# Patient Record
Sex: FEMALE | Race: BLACK OR AFRICAN AMERICAN | NOT HISPANIC OR LATINO | Employment: FULL TIME | URBAN - METROPOLITAN AREA
[De-identification: names, ages, dates, MRNs, and addresses within clinical notes are randomized per-mention and may not be internally consistent; named-entity substitution may affect disease eponyms.]

---

## 2020-11-11 ENCOUNTER — TELEPHONE (OUTPATIENT)
Dept: ORTHOPEDICS | Facility: CLINIC | Age: 55
End: 2020-11-11

## 2020-11-11 DIAGNOSIS — R52 PAIN: Primary | ICD-10-CM

## 2020-11-12 ENCOUNTER — HOSPITAL ENCOUNTER (OUTPATIENT)
Dept: RADIOLOGY | Facility: OTHER | Age: 55
Discharge: HOME | End: 2020-11-12
Attending: PHYSICIAN ASSISTANT
Payer: COMMERCIAL

## 2020-11-12 ENCOUNTER — OFFICE VISIT (OUTPATIENT)
Dept: ORTHOPEDICS | Facility: CLINIC | Age: 55
End: 2020-11-12
Payer: COMMERCIAL

## 2020-11-12 VITALS
SYSTOLIC BLOOD PRESSURE: 108 MMHG | HEART RATE: 101 BPM | BODY MASS INDEX: 30.12 KG/M2 | WEIGHT: 170 LBS | HEIGHT: 63 IN | DIASTOLIC BLOOD PRESSURE: 80 MMHG

## 2020-11-12 DIAGNOSIS — M77.12 LATERAL EPICONDYLITIS OF LEFT ELBOW: ICD-10-CM

## 2020-11-12 DIAGNOSIS — R52 PAIN: ICD-10-CM

## 2020-11-12 DIAGNOSIS — M65.4 RADIAL STYLOID TENOSYNOVITIS (DE QUERVAIN): Primary | ICD-10-CM

## 2020-11-12 DIAGNOSIS — R20.0 FINGER NUMBNESS: ICD-10-CM

## 2020-11-12 PROCEDURE — 99999 PR PBB SHADOW E&M-EST. PATIENT-LVL III: ICD-10-PCS | Mod: PBBFAC,,, | Performed by: PHYSICIAN ASSISTANT

## 2020-11-12 PROCEDURE — 97760 ORTHOTIC MGMT&TRAING 1ST ENC: CPT | Mod: GP,S$GLB,, | Performed by: PHYSICIAN ASSISTANT

## 2020-11-12 PROCEDURE — 73110 X-RAY EXAM OF WRIST: CPT | Mod: 26,,, | Performed by: RADIOLOGY

## 2020-11-12 PROCEDURE — 1125F PR PAIN SEVERITY QUANTIFIED, PAIN PRESENT: ICD-10-PCS | Mod: S$GLB,,, | Performed by: PHYSICIAN ASSISTANT

## 2020-11-12 PROCEDURE — 99999 PR PBB SHADOW E&M-EST. PATIENT-LVL III: CPT | Mod: PBBFAC,,, | Performed by: PHYSICIAN ASSISTANT

## 2020-11-12 PROCEDURE — 3008F BODY MASS INDEX DOCD: CPT | Mod: CPTII,S$GLB,, | Performed by: PHYSICIAN ASSISTANT

## 2020-11-12 PROCEDURE — 99204 OFFICE O/P NEW MOD 45 MIN: CPT | Mod: S$GLB,,, | Performed by: PHYSICIAN ASSISTANT

## 2020-11-12 PROCEDURE — 97760 PR ORTHOTIC MGMT&TRAINJ INITIAL ENC EA 15 MINS: ICD-10-PCS | Mod: GP,S$GLB,, | Performed by: PHYSICIAN ASSISTANT

## 2020-11-12 PROCEDURE — 73110 X-RAY EXAM OF WRIST: CPT | Mod: TC,50,FY

## 2020-11-12 PROCEDURE — 73110 XR WRIST COMPLETE 3 VIEWS BILATERAL: ICD-10-PCS | Mod: 26,,, | Performed by: RADIOLOGY

## 2020-11-12 PROCEDURE — 1125F AMNT PAIN NOTED PAIN PRSNT: CPT | Mod: S$GLB,,, | Performed by: PHYSICIAN ASSISTANT

## 2020-11-12 PROCEDURE — 99204 PR OFFICE/OUTPT VISIT, NEW, LEVL IV, 45-59 MIN: ICD-10-PCS | Mod: S$GLB,,, | Performed by: PHYSICIAN ASSISTANT

## 2020-11-12 PROCEDURE — 3008F PR BODY MASS INDEX (BMI) DOCUMENTED: ICD-10-PCS | Mod: CPTII,S$GLB,, | Performed by: PHYSICIAN ASSISTANT

## 2020-11-12 NOTE — PATIENT INSTRUCTIONS
Understanding De Quervain Tenosynovitis    De Quervain tenosynovitis is a condition that can cause wrist and thumb pain. Tendons connect muscles in your wrist and forearm to the bones in your thumb. The tendons have a protective cover (sheath). The sheaths lining makes a fluid that lets the tendons slide easily when you straighten your wrist and thumb. If any of these tendons are irritated or injured, they can become swollen and inflamed. This is called de Quervain tenosynovitis.  How to say it  Rebekah ten-oh-sin-oh-VY-tis   What causes de Quervain tenosynovitis?  This condition is most often caused from overuse. For example, making the same wrist motions over and over can irritate the tendons. This includes doing things like unscrewing jar lids or grasping a tool. Activities such as typing, playing racquet sports, knitting, and texting can also lead to the condition.  Symptoms of de Quervain tenosynovitis  You may have pain, soreness, redness, and swelling along the side of your wrist and the base of your thumb. You may feel pain when you pinch or grasp things, turn or touch your wrist, or make a fist. Your thumb may catch or make a crackling sound when you move it.  Treatment for de Quervain tenosynovitis  Treatments may include:  · Resting the wrist and thumb. This involves limiting movements that make your symptoms worse. You also may need to avoid certain hobbies, sports, and types of work for a time.  · Cold packs. These help reduce pain and swelling.  · Prescription or over-the-counter pain medicines. These help relieve pain and swelling.  · Splint or brace. This helps keep the thumb and wrist from moving and gives time for your tendons to heal.  · Exercises or physical therapy. These help stretch, strengthen, and improve the range of motion in your wrist and thumb.  · Shots of medicine into the area around the tendon. These may help relieve symptoms for a time.  · Surgery. You may need surgery if  other treatments dont relieve symptoms. During surgery, the surgeon releases the sheath that surrounds the tendons so the tendons can move more easily.  Possible complications of de Quervain tenosynovitis  Without proper care and treatment, healing may take longer than normal. Also, symptoms may continue or get worse. Over time, the problem may become long-term (chronic). This can make it hard to use your wrist and thumb for normal activities.  When to call your healthcare provider  Call your healthcare provider right away if you have any of these:  · Fever of 100.4°F (38°C) or higher, or as directed  · Symptoms that dont get better with treatment, or get worse  · Pain, numbness, or coldness in the hand  · New symptoms   Date Last Reviewed: 3/10/2016  © 5231-9820 [employer]. [address] [place] [address]. All rights reserved. This information is not intended as a substitute for professional medical care. Always follow your healthcare professional's instructions.        Understanding Lateral Epicondylitis    Tendons are strong bands of tissue that connect muscles to bones. Lateral epicondylitis affects the tendons that connect muscles in the forearm to the lateral epicondyle. This is the bony knob on the outer side of the elbow. The condition occurs if the extensor tendons of the wrist become red and swollen (irritated). This can cause pain in the elbow, forearm, and wrist. Because the condition is sometimes caused by playing tennis, it is also known as tennis elbow.  How to say it  LA-tuhr-remi ad-ys-ZJI-duh-LY-tis   What causes lateral epicondylitis?  The condition most often occurs because of overuse. This can be from any activity that repeatedly puts stress on the forearm extensor muscles or tendons and wrist. For instance, playing tennis, lifting weights, cutting meat, painting, and typing can all cause the condition. Wear and tear of the tendons from aging or an injury to the  tendons can also cause the condition.  Symptoms of lateral epicondylitis  The most common symptom is pain. You may feel it on the outer side of the elbow and down the back of the forearm. It may be worse when moving or using the elbow, forearm, or wrist. You may also feel pain when gripping or lifting things.  Treatment for lateral epicondylitis  Treatments may include:  · Resting the elbow, forearm, and wrist. Youll need to avoid movements that can make your symptoms worse. You also may need to avoid certain sports and types of work for a time. This helps relieve symptoms and prevent further damage to the tendons.  · Changing the action that caused the problem. For instance, if the tendons were damaged from playing tennis, it may help to change your playing technique or use different equipment. This helps prevent further damage to the tendons.  · Using cold packs. Putting an ice pack on the injured area can help reduce pain and swelling.  · Taking pain medicines. Taking prescription or over-the-counter pain medicines may help reduce pain and swelling.    · Wearing a brace. This helps reduce strain on the muscles and tendons in the forearm, which may relieve symptoms. It is very important to wear the brace properly.  · Doing exercises and physical therapy. These help improve strength and range of motion in the elbow, forearm, and wrist.  · Getting shots of medicine into the injured area. These may help relieve symptoms for a time.  · Having surgery. This may be an option if other treatments fail to relieve symptoms. In many cases, the surgeon removes the damaged tissue.  Possible complications of lateral epicondylitis  If the tendons involved dont heal properly, symptoms may return or get worse. To help prevent this, follow your treatment plan as directed.  When to call your healthcare provider  Call your healthcare provider right away if you have any of these:  · Fever of 100.4°F (38°C) or higher, or as  directed  · Redness, swelling, or warmth in the elbow or forearm that gets worse  · Symptoms that dont get better with treatment, or get worse  · New symptoms   Date Last Reviewed: 3/10/2016  © 1297-7413 [employer]. [address] [place] [address]. All rights reserved. This information is not intended as a substitute for professional medical care. Always follow your healthcare professional's instructions.        Understanding Carpal Tunnel Syndrome    The carpal tunnel is a narrow space inside the wrist. It is ringed by bone and a band of tough tissue called the transverse carpal ligament. A major nerve called the median nerve runs from the forearm into the hand through the carpal tunnel. Tendons also run through the carpal tunnel.  With carpal tunnel syndrome, the tendons or nearby tissues within the carpal tunnel may swell or thicken. Or the transverse carpal ligament may harden and shorten. This narrows the space in the carpal tunnel and puts pressure on the median nerve. This pressure leads to tingling and numbness of the hand and wrist. In time, the condition can make even simple tasks hard to do.  What causes carpal tunnel syndrome?  Doctors arent entirely clear why the condition occurs. Certain things may make a person more likely to have it. These include:  · Being female  · Being pregnant  · Being overweight  · Having diabetes or rheumatoid arthritis  Symptoms of carpal tunnel syndrome  Symptoms often come and go. At first, symptoms may occur mainly at night. Later, they may be noticed during the day as well. They may get worse with activities such as driving, reading, typing, or holding a phone. Symptoms can include:  · Tingling and numbness in the hand or wrist  · Sharp pain that shoots up the arm or down to the fingers  · Hand stiffness or cramping, especially in the morning  · Trouble making a fist  · Hand weakness and clumsiness  Treatment for carpal tunnel syndrome  Certain  treatments help reduce the pressure on the median nerve and relieve symptoms. Choices for treatment may include one or more of the following:  · Wrist splint. This involves wearing a special brace on the wrist and hand. The splint holds the wrist straight, in a neutral position. This helps keep the carpal tunnel as open as possible.  · Cortisone shots. Cortisone is a medicine that helps reduce swelling. It is injected directly into the wrist. It helps shrink tissues inside the carpal tunnel. This relieves symptoms for a time.  · Pain medicines. You may take over-the-counter or prescription medicines to help reduce swelling and relieve symptoms.  · Surgery. If the condition doesnt respond to other treatments and doesnt go away on its own, you may need surgery. During surgery, the surgeon cuts the transverse carpal ligament to relieve pressure on the median nerve.     When to call your healthcare provider  Call your healthcare provider right away if you have any of these:  · Fever of 100.4°F (38°C) or higher, or as directed  · Symptoms that dont get better, or get worse  · New symptoms   Date Last Reviewed: 3/10/2016  © 7001-0567 [employer]. [address] [place] [address], [place] [address]. All rights reserved. This information is not intended as a substitute for professional medical care. Always follow your healthcare professional's instructions.

## 2020-11-12 NOTE — PROGRESS NOTES
"Subjective:      Patient ID: Raman Goldstein is a 55 y.o. female.    Chief Complaint: Pain of the Left Wrist and Pain of the Right Wrist      HPI  Raman Goldstein is a right hand dominant 55 y.o. female presenting today for bilateral wrist pains.  There was not a history of trauma or known repetitive activity.  Onset of symptoms began 1 month ago.  She reports that she initially had pain in the lateral left elbow 2 months ago, then began to have bilateral radial wrist pain.  She reports that the wrist pain is most bothersome, gets intermittent left elbow pains as well.  She has occasional night time numbness and tingling in the left hand.   She reports taking naproxen yesterday and feeling good relief.      Review of patient's allergies indicates:   Allergen Reactions    Grass pollen-june grass standard Hives    Shellfish containing products          Current Outpatient Medications   Medication Sig Dispense Refill    hydrOXYzine (ATARAX) 25 MG tablet Take 25 mg by mouth every 4 (four) hours as needed for Itching.      ibuprofen (ADVIL,MOTRIN) 800 MG tablet Take 1 tablet (800 mg total) by mouth every 6 (six) hours as needed for Pain. 30 tablet 0     No current facility-administered medications for this visit.        Past Medical History:   Diagnosis Date    Allergy        Past Surgical History:   Procedure Laterality Date    HYSTERECTOMY           Review of Systems:  Review of Systems   Constitution: Negative for chills and fever.   Skin: Negative for rash and suspicious lesions.   Musculoskeletal:        See HPI   Neurological: Negative for dizziness, headaches and light-headedness.   Psychiatric/Behavioral: Negative for depression. The patient is not nervous/anxious.          OBJECTIVE:     PHYSICAL EXAM:  Height: 5' 3" (160 cm) Weight: 77.1 kg (170 lb)  Vitals:    11/12/20 1546   BP: 108/80   Pulse: 101   Weight: 77.1 kg (170 lb)   Height: 5' 3" (1.6 m)   PainSc:   9     General    Vitals " reviewed.  Constitutional: She is oriented to person, place, and time. She appears well-developed and well-nourished.   HENT:   Head: Normocephalic and atraumatic.   Neck: Normal range of motion.   Cardiovascular: Normal rate.    Pulmonary/Chest: Effort normal. No respiratory distress.   Neurological: She is alert and oriented to person, place, and time.   Psychiatric: She has a normal mood and affect. Her behavior is normal. Judgment and thought content normal.             Musculoskeletal:  No scars noted.  No edema.  She is tender to palpation bilaterally at the radial styloid, mildly tender at the CMC joint bilaterally and at the left lateral epicondyle.  Good finger and wrist range of motion bilaterally, pain with bilateral wrist hyperextension, hyperflexion, radial and ulnar deviation.  Positive Finkelstein's bilaterally.  Good elbow range of motion.  Neurovascularly intact-good sensation and motor function, good capillary refill, 2+ radial pulses.  Negative Tinel's and negative Durkan's bilaterally.    RADIOGRAPHS:  Bilateral Wrist XRay, 11/12/2020  FINDINGS:  No acute fracture or dislocation seen.  Mild joint space narrowing 1st CMC joint on the left.  No suspicious osseous erosions.  Soft tissues are unremarkable with no radiopaque retained foreign body.     Impression:   No acute osseous abnormality seen.    Comments: I have personally reviewed the imaging and I agree with the above radiologist's report.    ASSESSMENT/PLAN:   Raman Larose was seen today for pain and pain.    Diagnoses and all orders for this visit:    Radial styloid tenosynovitis (de quervain)  -     Ambulatory referral/consult to Physical/Occupational Therapy    Lateral epicondylitis of left elbow  -     Ambulatory referral/consult to Physical/Occupational Therapy    Finger numbness  -     Ambulatory referral/consult to Physical/Occupational Therapy           - We talked at length about the anatomy and pathophysiology of   Encounter  Diagnoses   Name Primary?    Radial styloid tenosynovitis (de quervain) Yes    Lateral epicondylitis of left elbow     Finger numbness        - discussed patient's symptoms and physical exam findings, discussed conservative and surgical treatment options for de Quervain tenosynovitis, lateral epicondylitis, and nerve compression/carpal tunnel.  - orders for OT  - NSAIDs as needed  - discussed use of braces, thumb spica brace is recommended and provided (15 minutes spent preparing, fitting, and educating on brace).  - follow-up in 6-8 weeks, sooner if needed  - call with questions or concerns    Disclaimer: This note has been generated using voice-recognition software. There may be typographical errors that have been missed during proof-reading.

## 2020-11-27 ENCOUNTER — CLINICAL SUPPORT (OUTPATIENT)
Dept: REHABILITATION | Facility: HOSPITAL | Age: 55
End: 2020-11-27
Attending: PHYSICIAN ASSISTANT
Payer: COMMERCIAL

## 2020-11-27 DIAGNOSIS — R29.898 DECREASED PINCH STRENGTH: ICD-10-CM

## 2020-11-27 DIAGNOSIS — Z74.1 SELF-CARE DEFICIT IN DRESSING: ICD-10-CM

## 2020-11-27 DIAGNOSIS — M25.60 RANGE OF MOTION DEFICIT: ICD-10-CM

## 2020-11-27 DIAGNOSIS — Z74.1 SELF-CARE DEFICIT FOR FEEDING, BATHING, AND TOILETING: ICD-10-CM

## 2020-11-27 DIAGNOSIS — R29.898 DECREASED GRIP STRENGTH: ICD-10-CM

## 2020-11-27 PROCEDURE — 97165 OT EVAL LOW COMPLEX 30 MIN: CPT | Mod: PN

## 2020-11-27 PROCEDURE — 97035 APP MDLTY 1+ULTRASOUND EA 15: CPT | Mod: PN

## 2020-11-27 NOTE — PLAN OF CARE
[facility] Occupational Therapy  Initial Evaluation     Date:  11/27/2020    Name: Raman Goldstein  Clinic Number: [id number]    Therapy Diagnosis:   1. Range of motion deficit     2. Decreased  strength     3. Decreased pinch strength     4. Self-care deficit for feeding, bathing, and toileting     5. Self-care deficit in dressing        Physician: Stacy Frank PA    Physician Orders: Eval and treat   Medical Diagnosis:Radial styloid tenosynovitis (de quervain), Lateral epicondylitis of left elbow  Surgical Procedure and Date: n/a  Evaluation Date: 11/27/2020  Insurance Authorization Period Expiration: 12/31/20  Plan of Care Certification Period: 11/26/20 -1/22/21  Date of Return to MD: 12/28/20    Visit # / Visits authorized: 1 / 20    Time In: 8:49 am  Time Out: 9:45 am  Total Billable Time: 56 minutes    Precautions:  Standard    Subjective     Involved Side: Bilateral   Dominant Side: Right  Date of Onset: Late September  Mechanism of Injury: Insidious onset - may have been waxing her car.  History of Current Condition: increased pain in bilateral wrist, attempted sporadic aleve  Surgical Procedure: n/a  Imaging: xray FINDINGS:  No acute fracture or dislocation seen.  Mild joint space narrowing 1st CMC joint on the left.  No suspicious osseous erosions.  Soft tissues are unremarkable with no radiopaque retained foreign body.  Impression:  No acute osseous abnormality seen.  Electronically signed by: Lacie Vincent  Date:                                            11/12/2020    Previous Therapy: none    Past Medical History/Physical Systems Review:   Raman Goldstein  has a past medical history of Allergy.    Raman Goldstein  has a past surgical history that includes Hysterectomy.    Raman Larose has a current medication list which includes the following prescription(s): hydroxyzine hcl and ibuprofen.    Review of patient's allergies indicates:   Allergen Reactions     Grass pollen-june grass standard Hives    Shellfish containing products         Patient's Goals for Therapy: Full use of my hands, without pain    Pain:  Functional Pain Scale Rating 0-10:   5-6/10 at best  7-8/10 at worst  Location: distal radial forearm/wrist  Description: Sharp  Aggravating Factors: pushing up on hands, pain with space bar right   Easing Factors: hot water    Occupation:   in [place]  Working presently: employed  Duties: computer and conversation    Functional Limitations/Social History:    Previous functional status includes: Independent with all ADLs.     Current FunctionalStatus   Home/Living environment : lives alone      Limitation of Functional Status as follows:   ADLs/IADLs:     - Feeding: modified task    - Bathing: pain with pushing up from tub    - Dressing/Grooming: challenging donning bra, pulling on pants and socks    - Driving: modify      Leisure: gardening     Objective    Observation: Skin intact, no redness or swelling note in wrists    Sensation: Ulnar and Median Nerve  Intact  Ingalls Sharron Monofilament Test: No  Stereognosis: Intact    Special Tests:   Finkelstein's Test  Positive bilaterally    Edema: Circumferential measurements: as follows:    Thumb  left Thumb right       Proximal Plalnx 5.9 cm 6.4 cm      DIP Joint 5.8 cm 6.1 cm      Distal Phalnx 5.1 cm 5.5 cm          Range of Motion: right and left Active  (Ext/Flex) Thumb Thumb right      MP 0/57 0/49      DIP 0/48 0/37        Thumb Opposition: to all tips and 5th MCP head bilaterally  Palmar Abduction: L 35  R  40  Radial Abduction: 45 bilaterally    Wrist Ext/Flex: 60/50    55/55  Wrist RD/UD: 5/35   5/25  Supination/Pronation: WFL bilaterally    Manual Muscle Test: deferred secondary to pain     Strength: (SHAYY Dynamometer in lbs.)  1 trial, Position II  Right: 40.1#  Left: 44.5#    Pinch Strength: (Pinch Gauge in psi's), 1  trial  Adams Pinch R) 13psi's   L) 15psi's  3pt Pinch   R) 10psi's   L) 9psi's      Fine Blair Coordination Tests:   9 hole Peg Test R) 18 seconds no drops   L) 26 seconds dropped 1       CMS Impairment/Limitation/Restriction for FOTO wrist  Survey    Therapist reviewed FOTO scores for Raman Goldstein on 11/27/2020.   FOTO documents entered into [facility] - see Media section.    Limitation Score: 52%         Treatment     Treatment Time In: 9:30 am  Treatment Time Out: 9:45 am  Total Treatment time separate from Evaluation time:15    Raman received the following direct contact modalities after being cleared for contraindications for 12 minutes:  -Patient receives ultrasound  for pain control and decreased inflammation @ continuous duty cycle, 3.3 Mhz, applied to bilateral distal radial wrist, intensity = .5 w/cm2 for 12 minutes.    Home Exercise Program/Education:  Issued HEP (see patient instructions in EMR) and educated on modality use for pain management . Exercises were reviewed and Raman was able to demonstrate them prior to the end of the session.   Pt received a written copy of exercises to perform at home. Raman demonstrated good  understanding of the education provided.  Pt was advised to perform these exercises free of pain, and to stop performing them if pain occurs.    Patient/Family Education: role of OT, goals for OT, scheduling/cancellations - pt verbalized understanding. Discussed insurance limitations with patient.    Additional Education provided: pathology of tenosynovitis, importance of stretching and icing.     Assessment     Raman Goldstein is a 55 y.o. female referred to outpatient occupational therapy and presents with a medical diagnosis of Radial styloid tenosynovitis (de quervain), Lateral epicondylitis of left elbow, resulting in Decreased ROM, Decreased  strength, Decreased pinch strength, Decreased functional hand use and Increased pain and demonstrates limitations as described in the chart below. Following medical record review it  is determined that pt will benefit from occupational therapy services in order to maximize pain free and/or functional use of bilateral hands. The following goals were discussed with the patient and patient is in agreement with them as to be addressed in the treatment plan. The patient's rehab potential is Good.     Anticipated barriers to occupational therapy: none noted  Pt has no cultural, educational or language barriers to learning provided.    Profile and History Assessment of Occupational Performance Level of Clinical Decision Making Complexity Score   Occupational Profile:   Raman Goldstein is a 55 y.o. 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 Raman Goldstein has difficulty with  feeding, bathing, grooming and dressing  affecting his/her daily functional abilities. His/her main goal for therapy is Full use of my hands, without pain.     Comorbidities:   none noted    Medical and Therapy History Review:   Brief               Performance Deficits    Physical:  Joint Mobility   Strength  Pinch Strength  Pain    Cognitive:  No Deficits    Psychosocial:    No Deficits     Clinical Decision Making:  low    Assessment Process:  Problem-Focused Assessments    Modification/Need for Assistance:  Minimal-Moderate Modifications/Assistance    Intervention Selection:  Several Treatment Options       low  Based on PMHX, co morbidities , data from assessments and functional level of assistance required with task and clinical presentation directly impacting function.       The following goals were discussed with the patient and patient is in agreement with them as to be addressed in the treatment plan.     Goals:   Short term goals met in 4 weeks(12/28/20   Patient to be IND with HEP and modalities for pain/edema managment.   Increase right wrist and thumb AROM 10 degrees to increase functional hand use for ADLs/work/leisure activities.   Increase  strength 5 lbs. Each  hand to improve functional grasp for ADLs/work/leisure activities.   Decrease complaints of pain to  5 out of 10 to increase functional hand use for ADL/work/leisure activities.    Patient to be IND and consistent with Orthotic use    Long term goals to me met by d/c  Pt will demonstrate symmetric AROM of wrists and thumbs for improved functional use dressing and bathing  Pt will increase pinch strength to 14# for completion of bottle top opening   Pt will increase  strength to 50# right hand for leisure tasks  Pt will report MD of 2 out of 10 with active use of hands  Pt will engage in joint protection with her gardening and outdoor tasks    Plan   Certification Period/Plan of care expiration: 11/27/2020 to 1/22/21.    Outpatient Occupational Therapy 2 times weekly for 8 weeks to include the following interventions: Paraffin, Fluidotherapy, Modalities for pain management, US 3 mhz, Therapeutic exercises/activities. and Strengthening.      SUSANNAH Maharaj    I certify the need for these services furnished under this plan of treatment and while under my care    ____________________________________  Physician/Referring Practitioner    _______________  Date of Signature

## 2020-12-02 NOTE — PROGRESS NOTES
Occupational Therapy Treatment Note     Date: 12/3/2020  Name: Raman Goldstein  Clinic Number: [id number]    Therapy Diagnosis:   Encounter Diagnoses   Name Primary?    Range of motion deficit Yes    Decreased  strength     Decreased pinch strength     Self-care deficit for feeding, bathing, and toileting     Self-care deficit in dressing      Physician: Stacy Frank PA    Physician Orders: Eval and treat   Medical Diagnosis:Radial styloid tenosynovitis (de quervain), Lateral epicondylitis of left elbow  Surgical Procedure and Date: n/a  Evaluation Date: 11/27/2020  Insurance Authorization Period Expiration: 12/31/20  Plan of Care Certification Period: 11/26/20 -1/22/21  Date of Return to MD: 12/28/20     Visit # / Visits authorized: 2 / 20     Time In: 5:00 pm  Time Out: 5:48 pm  Total Billable Time: 48  minutes     Precautions:  Standard      Subjective     Pt reports: My left hand is doing much better.   she was not compliant with home exercise program given 11/27/20 as she did not ice after stretching   Response to previous treatment: no adverse affects  Functional change: none     Pain: 5-6/10  Right ,   2-3/10 left at rest  Location: bilateral wrists      Objective     Raman received the following direct contact modalities after being cleared for contraindications for 10 minutes:  -Patient receives ultrasound  for pain control and decreased inflammation @ continuous duty cycle, 3.3 Mhz, applied to radial wrist and thumb bilaterally , intensity = .5 w/cm2 for 5 minutes each hand.    Raman received the following manual therapy techniques for 38 minutes:   -IASTM of thumb extensor/abductor muscles of each hand with momma bear-sweeping and stroking  -manual massage of muscle bellies  -friction massage of radial wrists until decrease in pain  -finklestein stretch x 10 reps holding stretch 5 seconds  -ice massage until numb      Home Exercises and Education Provided     Education provided:    - re instruction in stretch and the importance of icing afterward  - Progress towards goals     Written Home Exercises Provided: Patient instructed to cont prior HEP.  Exercises were reviewed and Raman was able to demonstrate them prior to the end of the session.  Raman demonstrated good  understanding of the HEP provided.   .   See EMR under Patient Instructions for exercises provided 11/27/20.        Assessment     Pt would continue to benefit from skilled OT. Raman reported improvement in hand symptoms post therapy session.  Better understanding of the stretch and ice HEP.      Raman is progressing well towards her goals and there are no updates to goals at this time. Pt prognosis is Good.     Pt will continue to benefit from skilled outpatient occupational therapy to address the deficits listed in the problem list on initial evaluation provide pt/family education and to maximize pt's level of independence in the home and community environment.     Anticipated barriers to occupational therapy: none noted    Pt's spiritual, cultural and educational needs considered and pt agreeable to plan of care and goals.    Goals:  Short term goals met in 4 weeks(12/28/20)   Patient to be IND with HEP and modalities for pain/edema managment.   Increase right wrist and thumb AROM 10 degrees to increase functional hand use for ADLs/work/leisure activities.   Increase  strength 5 lbs. Each hand to improve functional grasp for ADLs/work/leisure activities.   Decrease complaints of pain to  5 out of 10 to increase functional hand use for ADL/work/leisure activities.    Patient to be IND and consistent with Orthotic use     Long term goals to me met by d/c  Pt will demonstrate symmetric AROM of wrists and thumbs for improved functional use dressing and bathing  Pt will increase pinch strength to 14# for completion of bottle top opening   Pt will increase  strength to 50# right hand for leisure tasks  Pt will  report CT of 2 out of 10 with active use of hands  Pt will engage in joint protection with her gardening and outdoor tasks    Plan   Progress to strengthening when she is pain free with active motion .      SUSANNAH Maharja

## 2020-12-03 ENCOUNTER — CLINICAL SUPPORT (OUTPATIENT)
Dept: REHABILITATION | Facility: HOSPITAL | Age: 55
End: 2020-12-03
Attending: PHYSICIAN ASSISTANT
Payer: COMMERCIAL

## 2020-12-03 DIAGNOSIS — R29.898 DECREASED PINCH STRENGTH: ICD-10-CM

## 2020-12-03 DIAGNOSIS — M25.60 RANGE OF MOTION DEFICIT: Primary | ICD-10-CM

## 2020-12-03 DIAGNOSIS — R29.898 DECREASED GRIP STRENGTH: ICD-10-CM

## 2020-12-03 DIAGNOSIS — Z74.1 SELF-CARE DEFICIT FOR FEEDING, BATHING, AND TOILETING: ICD-10-CM

## 2020-12-03 DIAGNOSIS — Z74.1 SELF-CARE DEFICIT IN DRESSING: ICD-10-CM

## 2020-12-03 PROCEDURE — 97035 APP MDLTY 1+ULTRASOUND EA 15: CPT | Mod: PN

## 2020-12-03 PROCEDURE — 97140 MANUAL THERAPY 1/> REGIONS: CPT | Mod: PN

## 2020-12-08 ENCOUNTER — TELEPHONE (OUTPATIENT)
Dept: ORTHOPEDICS | Facility: CLINIC | Age: 55
End: 2020-12-08

## 2020-12-08 NOTE — TELEPHONE ENCOUNTER
Lm on vm advising pt to call office back to reschedule appt 12/28/2020 due to Amber will be out of the office.

## 2020-12-09 NOTE — PROGRESS NOTES
Occupational Therapy Treatment Note     Date: 12/10/2020  Name: Raman Goldstein  Clinic Number: [id number]    Therapy Diagnosis:   Encounter Diagnoses   Name Primary?    Range of motion deficit Yes    Decreased  strength     Decreased pinch strength     Self-care deficit for feeding, bathing, and toileting     Self-care deficit in dressing      Physician: Stacy Frank PA    Physician Orders: Eval and treat   Medical Diagnosis:Radial styloid tenosynovitis (de quervain), Lateral epicondylitis of left elbow  Surgical Procedure and Date: n/a  Evaluation Date: 11/27/2020  Insurance Authorization Period Expiration: 12/31/20  Plan of Care Certification Period: 11/26/20 -1/22/21  Date of Return to MD: 12/28/20     Visit # / Visits authorized: 3 / 20     Time In: 5:11 pm  Time Out: 5:55 pm  Total Billable Time: 44  minutes     Precautions:  Standard      Subjective     Pt reports: My right hand started hurting more yesterday.  My left hand is better   she was compliant with home exercise program given 11/27/20 as she did not ice after stretching   Response to previous treatment: felt good  Functional change: none     Pain: 8/10  Right post therapy 5/10,   2/10 left at rest  Location: bilateral wrists      Objective     Raman received the following direct contact modalities after being cleared for contraindications for 12 minutes:  -Patient receives ultrasound  for pain control and decreased inflammation @ continuous duty cycle, 3.3 Mhz, applied to radial wrist and thumb bilaterally , intensity = .5 w/cm2 for 6 minutes each hand.    Raman received the following manual therapy techniques for 32 minutes:   -IASTM of thumb extensor/abductor muscles of each hand with momma bear-sweeping and stroking  -manual massage of muscle bellies of extensor /abductors of thumb each arm  -friction massage of radial wrists until decrease in pain  -finklestein stretch x 10 reps holding stretch 5 seconds  -thumb to  pinky slides x 10 reps each hand.  -ice massage until numb      Home Exercises and Education Provided     Education provided:   - encouraged continued splint use as well as stretching and icing.   - Progress towards goals     Written Home Exercises Provided: Patient instructed to cont prior HEP.  Exercises were reviewed and Raman was able to demonstrate them prior to the end of the session.  Raman demonstrated good  understanding of the HEP provided.   .   See EMR under Patient Instructions for exercises provided 11/27/20.        Assessment     Pt would continue to benefit from skilled OT. Raman with significant improvement of right wrist pain post therapy session.  Left wrist with improvement in pain since last therapy session.      Raman is progressing well towards her goals and there are no updates to goals at this time. Pt prognosis is Good.     Pt will continue to benefit from skilled outpatient occupational therapy to address the deficits listed in the problem list on initial evaluation provide pt/family education and to maximize pt's level of independence in the home and community environment.     Anticipated barriers to occupational therapy: none noted    Pt's spiritual, cultural and educational needs considered and pt agreeable to plan of care and goals.    Goals:  Short term goals met in 4 weeks(12/28/20)   Patient to be IND with HEP and modalities for pain/edema managment.   Increase right wrist and thumb AROM 10 degrees to increase functional hand use for ADLs/work/leisure activities.   Increase  strength 5 lbs. Each hand to improve functional grasp for ADLs/work/leisure activities.   Decrease complaints of pain to  5 out of 10 to increase functional hand use for ADL/work/leisure activities.    Patient to be IND and consistent with Orthotic use     Long term goals to me met by d/c  Pt will demonstrate symmetric AROM of wrists and thumbs for improved functional use dressing and bathing  Pt  will increase pinch strength to 14# for completion of bottle top opening   Pt will increase  strength to 50# right hand for leisure tasks  Pt will report [place] of 2 out of 10 with active use of hands  Pt will engage in joint protection with her gardening and outdoor tasks    Plan   Progress to strengthening when she is pain free with active motion .      SUSANNAH Maharaj

## 2020-12-10 ENCOUNTER — CLINICAL SUPPORT (OUTPATIENT)
Dept: REHABILITATION | Facility: HOSPITAL | Age: 55
End: 2020-12-10
Attending: PHYSICIAN ASSISTANT
Payer: COMMERCIAL

## 2020-12-10 DIAGNOSIS — Z74.1 SELF-CARE DEFICIT FOR FEEDING, BATHING, AND TOILETING: ICD-10-CM

## 2020-12-10 DIAGNOSIS — R29.898 DECREASED PINCH STRENGTH: ICD-10-CM

## 2020-12-10 DIAGNOSIS — M25.60 RANGE OF MOTION DEFICIT: Primary | ICD-10-CM

## 2020-12-10 DIAGNOSIS — Z74.1 SELF-CARE DEFICIT IN DRESSING: ICD-10-CM

## 2020-12-10 DIAGNOSIS — R29.898 DECREASED GRIP STRENGTH: ICD-10-CM

## 2020-12-10 PROCEDURE — 97140 MANUAL THERAPY 1/> REGIONS: CPT | Mod: PN

## 2020-12-10 PROCEDURE — 97035 APP MDLTY 1+ULTRASOUND EA 15: CPT | Mod: PN

## 2020-12-11 ENCOUNTER — TELEPHONE (OUTPATIENT)
Dept: ORTHOPEDICS | Facility: CLINIC | Age: 55
End: 2020-12-11

## 2020-12-17 ENCOUNTER — DOCUMENTATION ONLY (OUTPATIENT)
Dept: REHABILITATION | Facility: HOSPITAL | Age: 55
End: 2020-12-17

## 2020-12-17 DIAGNOSIS — R29.898 DECREASED PINCH STRENGTH: ICD-10-CM

## 2020-12-17 DIAGNOSIS — Z74.1 SELF-CARE DEFICIT IN DRESSING: ICD-10-CM

## 2020-12-17 DIAGNOSIS — R29.898 DECREASED GRIP STRENGTH: ICD-10-CM

## 2020-12-17 DIAGNOSIS — M25.60 RANGE OF MOTION DEFICIT: Primary | ICD-10-CM

## 2020-12-17 DIAGNOSIS — Z74.1 SELF-CARE DEFICIT FOR FEEDING, BATHING, AND TOILETING: ICD-10-CM

## 2020-12-17 NOTE — PROGRESS NOTES
Cancellation Note    Patient: Raman Goldstein  Date of Session: 12/17/2020  Diagnosis:   1. Range of motion deficit     2. Decreased  strength     3. Decreased pinch strength     4. Self-care deficit for feeding, bathing, and toileting     5. Self-care deficit in dressing       MRN: [id number]    Raman Goldstein cancelled her scheduled therapy appointment today via the SMS system with no reason provided.  This is the first appointment that Raman has not attended. Next appointment is scheduled for 12/24/20 and will follow up with patient at that time. No charges have been posted today.       SUSANNAH Maharaj  12/17/2020

## 2020-12-21 NOTE — PROGRESS NOTES
Occupational Therapy Treatment Note     Date: 12/22/2020  Name: Raman Goldstein  Clinic Number: [id number]    Therapy Diagnosis:   Encounter Diagnoses   Name Primary?    Range of motion deficit Yes    Decreased  strength     Decreased pinch strength     Self-care deficit for feeding, bathing, and toileting     Self-care deficit in dressing      Physician: Stacy Frank PA    Physician Orders: Eval and treat   Medical Diagnosis:Radial styloid tenosynovitis (de quervain), Lateral epicondylitis of left elbow  Surgical Procedure and Date: n/a  Evaluation Date: 11/27/2020  Insurance Authorization Period Expiration: 12/31/20  Plan of Care Certification Period: 11/26/20 -1/22/21  Date of Return to MD: 12/28/20     Visit # / Visits authorized: 4 / 20     Time In: 5:09  pm  Time Out: 6:10 pm  Total Billable Time: 61  minutes     Precautions:  Standard      Subjective     Pt reports: My right hand started hurting more yesterday.  My left hand is better   she was compliant with home exercise program given 11/27/20 as she did not ice after stretching   Response to previous treatment: felt good  Functional change: none     Pain: 6-7/10  Right ,   2/10 left at rest  Location: bilateral wrists      Objective       Reaassessment:  20 minutes  Edema: Circumferential measurements: as follows:                                                     11/27/20 12/22/20    Thumb  left Thumb right   thumb left   thumb right     Proximal Plalnx 5.9 cm 6.4 cm  5.6 cm   6.5 cm     DIP Joint 5.8 cm 6.1 cm  5.6 cm  6.2 cm     Distal Phalnx 5.1 cm 5.5 cm  4.8 cm  5.4 cm           Range of Motion: right and left Active                                                    11/27/20 12/22/20  (Ext/Flex) Thumb left Thumb right  thumb left   thumb right      MP 0/57 0/49  0/60  0/52     DIP 0/48 0/37  0/55  0/48                                    11/27/20 12/22/20  Palmar Abduction: L 35  R  40     L 50        R 50  Wrist Ext/Flex: 60/50    55/55       80/55     60/60  Wrist RD/UD: 5/35   5/25             10/35     10/20     Strength: (SHAYY Dynamometer in lbs.)  1 trial, Position II            11/27/20 12/22/20  Right: 40.1#           62.2#  Left: 44.5#             60.8#     Pinch Strength: (Pinch Gauge in psi's), 1  trial             Key Pinch 11/27/20 R) 13psi's   L) 15psi's                    12/22/20 R) 15 psi    L) 17 psi  3pt Pinch  11/27/20 R) 10psi's  L) 9 psi's                    12/22/20 R) 14 psi   L) 14 psi    Raman received the following direct contact modalities after being cleared for contraindications for 12 minutes:  -Patient receives ultrasound  for pain control and decreased inflammation @ continuous duty cycle, 3.3 Mhz, applied to radial wrist and thumb bilaterally , intensity = .5 w/cm2 for 6 minutes each hand.    Raman received the following manual therapy techniques for 29 minutes:   -IASTM of thumb extensor/abductor muscles of each hand with momma bear-sweeping and stroking  -manual massage of muscle bellies of extensor /abductors of thumb each arm  -friction massage of radial wrists until decrease in pain  -finklestein stretch x 10 reps holding stretch 5 seconds  -thumb to pinky slides x 10 reps each hand.  -ice massage until numb      Home Exercises and Education Provided     Education provided:   - encouraged continued splint use as well as stretching and icing.   - Progress towards goals     Written Home Exercises Provided: Patient instructed to cont prior HEP.  Exercises were reviewed and Raman was able to demonstrate them prior to the end of the session.  Raman demonstrated good  understanding of the HEP provided.   .   See EMR under Patient Instructions for exercises provided 11/27/20.        Assessment     Pt would continue to benefit from skilled OT. Raman with improvement in AROM and strength as noted above.  Some edema remains in right thumb.  Pain has decreased  more in left then in right hand.  Splints are being used at night only.    Raman is progressing well towards her goals and there are no updates to goals at this time. Pt prognosis is Good.     Pt will continue to benefit from skilled outpatient occupational therapy to address the deficits listed in the problem list on initial evaluation provide pt/family education and to maximize pt's level of independence in the home and community environment.     Anticipated barriers to occupational therapy: none noted    Pt's spiritual, cultural and educational needs considered and pt agreeable to plan of care and goals.    Goals:  Short term goals met in 4 weeks(12/28/20)   Patient to be IND with HEP and modalities for pain/edema managment.   Increase right wrist and thumb AROM 10 degrees to increase functional hand use for ADLs/work/leisure activities- met 12/22/20.   Increase  strength 5 lbs. Each hand to improve functional grasp for ADLs/work/leisure activities- met 12/22/20   Decrease complaints of pain to  5 out of 10 to increase functional hand use for ADL/work/leisure activities- met with left hand 12/22/20    Patient to be IND and consistent with Orthotic use- in progress     Long term goals to me met by d/c  Pt will demonstrate symmetric AROM of wrists and thumbs for improved functional use dressing and bathing  Pt will increase pinch strength to 14# for completion of bottle top opening   Pt will increase  strength to 50# right hand for leisure tasks  Pt will report MT of 2 out of 10 with active use of hands  Pt will engage in joint protection with her gardening and outdoor tasks    Plan   Progress to strengthening when she is pain free with active motion .      SUSANNAH Maharaj

## 2020-12-22 ENCOUNTER — CLINICAL SUPPORT (OUTPATIENT)
Dept: REHABILITATION | Facility: HOSPITAL | Age: 55
End: 2020-12-22
Attending: PHYSICIAN ASSISTANT
Payer: COMMERCIAL

## 2020-12-22 DIAGNOSIS — Z74.1 SELF-CARE DEFICIT IN DRESSING: ICD-10-CM

## 2020-12-22 DIAGNOSIS — R29.898 DECREASED GRIP STRENGTH: ICD-10-CM

## 2020-12-22 DIAGNOSIS — M25.60 RANGE OF MOTION DEFICIT: Primary | ICD-10-CM

## 2020-12-22 DIAGNOSIS — R29.898 DECREASED PINCH STRENGTH: ICD-10-CM

## 2020-12-22 DIAGNOSIS — Z74.1 SELF-CARE DEFICIT FOR FEEDING, BATHING, AND TOILETING: ICD-10-CM

## 2020-12-22 PROCEDURE — 97140 MANUAL THERAPY 1/> REGIONS: CPT | Mod: PN

## 2020-12-22 PROCEDURE — 97168 OT RE-EVAL EST PLAN CARE: CPT | Mod: PN

## 2020-12-22 PROCEDURE — 97035 APP MDLTY 1+ULTRASOUND EA 15: CPT | Mod: PN

## 2020-12-23 NOTE — PATIENT INSTRUCTIONS
Extension (Active With Finger Flexion)        With fingers curled, bend hand back at wrist. Hold _3___ seconds.  Then lower wrist down hold x 3 seconds  Repeat __10__ times. Do __2__ sessions per day.    Copyright © I. All rights reserved.   Circumduction (Active)        With fingers curled, move slowly at wrist in clock- wise circles __10__ times. Repeat counterclockwise. Do not move elbow or shoulder.  Do __2__ sessions per day.    Copyright © [employer]. All rights reserved.

## 2020-12-31 ENCOUNTER — CLINICAL SUPPORT (OUTPATIENT)
Dept: REHABILITATION | Facility: HOSPITAL | Age: 55
End: 2020-12-31
Attending: PHYSICIAN ASSISTANT
Payer: COMMERCIAL

## 2020-12-31 DIAGNOSIS — R29.898 DECREASED PINCH STRENGTH: ICD-10-CM

## 2020-12-31 DIAGNOSIS — Z74.1 SELF-CARE DEFICIT FOR FEEDING, BATHING, AND TOILETING: ICD-10-CM

## 2020-12-31 DIAGNOSIS — Z74.1 SELF-CARE DEFICIT IN DRESSING: ICD-10-CM

## 2020-12-31 DIAGNOSIS — R29.898 DECREASED GRIP STRENGTH: ICD-10-CM

## 2020-12-31 DIAGNOSIS — M25.60 RANGE OF MOTION DEFICIT: Primary | ICD-10-CM

## 2020-12-31 PROCEDURE — 97035 APP MDLTY 1+ULTRASOUND EA 15: CPT | Mod: PN

## 2020-12-31 PROCEDURE — 97140 MANUAL THERAPY 1/> REGIONS: CPT | Mod: PN

## 2020-12-31 NOTE — PROGRESS NOTES
Occupational Therapy Treatment Note     Date: 12/31/2020  Name: Raman Goldstein  Clinic Number: [id number]    Therapy Diagnosis:   Encounter Diagnoses   Name Primary?    Range of motion deficit Yes    Decreased  strength     Decreased pinch strength     Self-care deficit for feeding, bathing, and toileting     Self-care deficit in dressing      Physician: Stacy Frank PA    Physician Orders: Eval and treat   Medical Diagnosis:Radial styloid tenosynovitis (de quervain), Lateral epicondylitis of left elbow  Surgical Procedure and Date: n/a  Evaluation Date: 11/27/2020  Insurance Authorization Period Expiration: 12/31/20  Plan of Care Certification Period: 11/26/20 -1/22/21  Date of Return to MD: 12/28/20     Visit # / Visits authorized: 5 / 20     Time In: 5:00  pm  Time Out: 5:40 pm  Total Billable Time: 40  minutes     Precautions:  Standard      Subjective     Pt reports:  My left hand is feeling fine only minimal pain with use, right hand is giving me the blues  she was compliant with home exercise program given 11/27/20 as she did not ice after stretching   Response to previous treatment: great  Functional change: able to wash dishes, able to bathe    Pain: 7/10  Right ,   0/10 left at rest  Location: bilateral wrists      Objective     Raman received the following direct contact modalities after being cleared for contraindications for 12 minutes:  -Patient receives ultrasound  for pain control and decreased inflammation @ continuous duty cycle, 3.3 Mhz, applied to radial wrist and thumb bilaterally , intensity = .5 w/cm2 for 6 minutes each hand.    Raman received the following manual therapy techniques for 28 minutes:   -IASTM of thumb extensor/abductor muscles of each hand with momma bear-sweeping and stroking  -manual massage of muscle bellies of extensor /abductors of thumb each arm  -friction massage of radial wrists until decrease in pain  -finklestein stretch x 10 reps holding  stretch 5 seconds  -active wrist extension and flexion x 10 reps each hand        Home Exercises and Education Provided     Education provided:   - encouraged to continue with her HEP  - Progress towards goals     Written Home Exercises Provided: Patient instructed to cont prior HEP.  Exercises were reviewed and Raman was able to demonstrate them prior to the end of the session.  Raman demonstrated good  understanding of the HEP provided.   .   See EMR under Patient Instructions for exercises provided 11/27/20.        Assessment     Pt would continue to benefit from skilled OT. Raman has improvements in both hands upon conclusion of therapy session.  Left hand demonstrates more improvements then right.  Pt is able to reduce her pain with massaging at home.     Raman is progressing well towards her goals and there are no updates to goals at this time. Pt prognosis is Good.     Pt will continue to benefit from skilled outpatient occupational therapy to address the deficits listed in the problem list on initial evaluation provide pt/family education and to maximize pt's level of independence in the home and community environment.     Anticipated barriers to occupational therapy: none noted    Pt's spiritual, cultural and educational needs considered and pt agreeable to plan of care and goals.    Goals:  Short term goals met in 4 weeks(12/28/20)   Patient to be IND with HEP and modalities for pain/edema managment.   Increase right wrist and thumb AROM 10 degrees to increase functional hand use for ADLs/work/leisure activities- met 12/22/20.   Increase  strength 5 lbs. Each hand to improve functional grasp for ADLs/work/leisure activities- met 12/22/20   Decrease complaints of pain to  5 out of 10 to increase functional hand use for ADL/work/leisure activities- met with left hand 12/22/20    Patient to be IND and consistent with Orthotic use- in progress     Long term goals to me met by d/c  Pt will  demonstrate symmetric AROM of wrists and thumbs for improved functional use dressing and bathing  Pt will increase pinch strength to 14# for completion of bottle top opening   Pt will increase  strength to 50# right hand for leisure tasks  Pt will report [place] of 2 out of 10 with active use of hands  Pt will engage in joint protection with her gardening and outdoor tasks    Plan   Progress to strengthening when she is pain free with active motion .      SUSANNAH Maharaj

## 2021-01-07 ENCOUNTER — DOCUMENTATION ONLY (OUTPATIENT)
Dept: REHABILITATION | Facility: HOSPITAL | Age: 56
End: 2021-01-07

## 2021-01-07 DIAGNOSIS — R29.898 DECREASED GRIP STRENGTH: ICD-10-CM

## 2021-01-07 DIAGNOSIS — R29.898 DECREASED PINCH STRENGTH: ICD-10-CM

## 2021-01-07 DIAGNOSIS — Z74.1 SELF-CARE DEFICIT FOR FEEDING, BATHING, AND TOILETING: ICD-10-CM

## 2021-01-07 DIAGNOSIS — Z74.1 SELF-CARE DEFICIT IN DRESSING: ICD-10-CM

## 2021-01-07 DIAGNOSIS — M25.60 RANGE OF MOTION DEFICIT: Primary | ICD-10-CM

## 2021-01-14 ENCOUNTER — DOCUMENTATION ONLY (OUTPATIENT)
Dept: REHABILITATION | Facility: HOSPITAL | Age: 56
End: 2021-01-14

## 2021-01-14 DIAGNOSIS — R29.898 DECREASED PINCH STRENGTH: ICD-10-CM

## 2021-01-14 DIAGNOSIS — R29.898 DECREASED GRIP STRENGTH: ICD-10-CM

## 2021-01-14 DIAGNOSIS — M25.60 RANGE OF MOTION DEFICIT: Primary | ICD-10-CM

## 2021-01-14 DIAGNOSIS — Z74.1 SELF-CARE DEFICIT IN DRESSING: ICD-10-CM

## 2021-01-14 DIAGNOSIS — Z74.1 SELF-CARE DEFICIT FOR FEEDING, BATHING, AND TOILETING: ICD-10-CM

## 2021-01-21 ENCOUNTER — CLINICAL SUPPORT (OUTPATIENT)
Dept: REHABILITATION | Facility: HOSPITAL | Age: 56
End: 2021-01-21
Attending: PHYSICIAN ASSISTANT
Payer: COMMERCIAL

## 2021-01-21 DIAGNOSIS — Z74.1 SELF-CARE DEFICIT FOR FEEDING, BATHING, AND TOILETING: ICD-10-CM

## 2021-01-21 DIAGNOSIS — M25.60 RANGE OF MOTION DEFICIT: Primary | ICD-10-CM

## 2021-01-21 DIAGNOSIS — R29.898 DECREASED PINCH STRENGTH: ICD-10-CM

## 2021-01-21 DIAGNOSIS — Z74.1 SELF-CARE DEFICIT IN DRESSING: ICD-10-CM

## 2021-01-21 DIAGNOSIS — R29.898 DECREASED GRIP STRENGTH: ICD-10-CM

## 2021-01-21 PROCEDURE — 97168 OT RE-EVAL EST PLAN CARE: CPT | Mod: PN

## 2021-01-21 PROCEDURE — 97035 APP MDLTY 1+ULTRASOUND EA 15: CPT | Mod: PN

## 2021-01-21 PROCEDURE — 97110 THERAPEUTIC EXERCISES: CPT | Mod: PN

## 2021-01-28 ENCOUNTER — CLINICAL SUPPORT (OUTPATIENT)
Dept: REHABILITATION | Facility: HOSPITAL | Age: 56
End: 2021-01-28
Attending: PHYSICIAN ASSISTANT
Payer: COMMERCIAL

## 2021-01-28 DIAGNOSIS — R29.898 DECREASED PINCH STRENGTH: ICD-10-CM

## 2021-01-28 DIAGNOSIS — M25.60 RANGE OF MOTION DEFICIT: Primary | ICD-10-CM

## 2021-01-28 DIAGNOSIS — R29.898 DECREASED GRIP STRENGTH: ICD-10-CM

## 2021-01-28 DIAGNOSIS — Z74.1 SELF-CARE DEFICIT FOR FEEDING, BATHING, AND TOILETING: ICD-10-CM

## 2021-01-28 DIAGNOSIS — Z74.1 SELF-CARE DEFICIT IN DRESSING: ICD-10-CM

## 2021-01-28 PROCEDURE — 97035 APP MDLTY 1+ULTRASOUND EA 15: CPT | Mod: PN

## 2021-01-28 PROCEDURE — 97140 MANUAL THERAPY 1/> REGIONS: CPT | Mod: PN

## 2021-02-09 ENCOUNTER — CLINICAL SUPPORT (OUTPATIENT)
Dept: REHABILITATION | Facility: HOSPITAL | Age: 56
End: 2021-02-09
Attending: PHYSICIAN ASSISTANT
Payer: COMMERCIAL

## 2021-02-09 DIAGNOSIS — Z74.1 SELF-CARE DEFICIT FOR FEEDING, BATHING, AND TOILETING: ICD-10-CM

## 2021-02-09 DIAGNOSIS — R29.898 DECREASED GRIP STRENGTH: ICD-10-CM

## 2021-02-09 DIAGNOSIS — R29.898 DECREASED PINCH STRENGTH: ICD-10-CM

## 2021-02-09 DIAGNOSIS — M25.60 RANGE OF MOTION DEFICIT: Primary | ICD-10-CM

## 2021-02-09 DIAGNOSIS — Z74.1 SELF-CARE DEFICIT IN DRESSING: ICD-10-CM

## 2021-02-09 PROCEDURE — 97035 APP MDLTY 1+ULTRASOUND EA 15: CPT | Mod: PN

## 2021-02-09 PROCEDURE — 97140 MANUAL THERAPY 1/> REGIONS: CPT | Mod: PN

## 2021-02-10 ENCOUNTER — OFFICE VISIT (OUTPATIENT)
Dept: ORTHOPEDICS | Facility: CLINIC | Age: 56
End: 2021-02-10
Payer: COMMERCIAL

## 2021-02-10 VITALS
HEART RATE: 80 BPM | DIASTOLIC BLOOD PRESSURE: 84 MMHG | HEIGHT: 63 IN | SYSTOLIC BLOOD PRESSURE: 130 MMHG | BODY MASS INDEX: 30.12 KG/M2 | WEIGHT: 170 LBS

## 2021-02-10 DIAGNOSIS — M77.12 LATERAL EPICONDYLITIS OF LEFT ELBOW: ICD-10-CM

## 2021-02-10 DIAGNOSIS — M65.4 RADIAL STYLOID TENOSYNOVITIS (DE QUERVAIN): Primary | ICD-10-CM

## 2021-02-10 PROCEDURE — 99213 PR OFFICE/OUTPT VISIT, EST, LEVL III, 20-29 MIN: ICD-10-PCS | Mod: S$GLB,,, | Performed by: PHYSICIAN ASSISTANT

## 2021-02-10 PROCEDURE — 1125F AMNT PAIN NOTED PAIN PRSNT: CPT | Mod: S$GLB,,, | Performed by: PHYSICIAN ASSISTANT

## 2021-02-10 PROCEDURE — 3008F PR BODY MASS INDEX (BMI) DOCUMENTED: ICD-10-PCS | Mod: CPTII,S$GLB,, | Performed by: PHYSICIAN ASSISTANT

## 2021-02-10 PROCEDURE — 99999 PR PBB SHADOW E&M-EST. PATIENT-LVL III: ICD-10-PCS | Mod: PBBFAC,,, | Performed by: PHYSICIAN ASSISTANT

## 2021-02-10 PROCEDURE — 99213 OFFICE O/P EST LOW 20 MIN: CPT | Mod: S$GLB,,, | Performed by: PHYSICIAN ASSISTANT

## 2021-02-10 PROCEDURE — 3008F BODY MASS INDEX DOCD: CPT | Mod: CPTII,S$GLB,, | Performed by: PHYSICIAN ASSISTANT

## 2021-02-10 PROCEDURE — 1125F PR PAIN SEVERITY QUANTIFIED, PAIN PRESENT: ICD-10-PCS | Mod: S$GLB,,, | Performed by: PHYSICIAN ASSISTANT

## 2021-02-10 PROCEDURE — 99999 PR PBB SHADOW E&M-EST. PATIENT-LVL III: CPT | Mod: PBBFAC,,, | Performed by: PHYSICIAN ASSISTANT

## 2021-03-18 ENCOUNTER — DOCUMENTATION ONLY (OUTPATIENT)
Dept: REHABILITATION | Facility: HOSPITAL | Age: 56
End: 2021-03-18

## 2021-03-18 DIAGNOSIS — Z74.1 SELF-CARE DEFICIT IN DRESSING: ICD-10-CM

## 2021-03-18 DIAGNOSIS — R29.898 DECREASED GRIP STRENGTH: ICD-10-CM

## 2021-03-18 DIAGNOSIS — M25.60 RANGE OF MOTION DEFICIT: Primary | ICD-10-CM

## 2021-03-18 DIAGNOSIS — R29.898 DECREASED PINCH STRENGTH: ICD-10-CM

## 2021-03-18 DIAGNOSIS — Z74.1 SELF-CARE DEFICIT FOR FEEDING, BATHING, AND TOILETING: ICD-10-CM

## 2021-04-16 ENCOUNTER — PATIENT MESSAGE (OUTPATIENT)
Dept: RESEARCH | Facility: HOSPITAL | Age: 56
End: 2021-04-16

## 2021-09-28 ENCOUNTER — HOSPITAL ENCOUNTER (EMERGENCY)
Facility: OTHER | Age: 56
Discharge: HOME | End: 2021-09-28
Attending: EMERGENCY MEDICINE
Payer: COMMERCIAL

## 2021-09-28 VITALS
TEMPERATURE: 99 F | OXYGEN SATURATION: 98 % | HEIGHT: 63 IN | RESPIRATION RATE: 18 BRPM | HEART RATE: 93 BPM | SYSTOLIC BLOOD PRESSURE: 133 MMHG | DIASTOLIC BLOOD PRESSURE: 74 MMHG | WEIGHT: 193 LBS | BODY MASS INDEX: 34.2 KG/M2

## 2021-09-28 DIAGNOSIS — K21.9 GASTROESOPHAGEAL REFLUX DISEASE, UNSPECIFIED WHETHER ESOPHAGITIS PRESENT: Primary | ICD-10-CM

## 2021-09-28 DIAGNOSIS — R07.89 ATYPICAL CHEST PAIN: ICD-10-CM

## 2021-09-28 DIAGNOSIS — R07.9 CHEST PAIN: ICD-10-CM

## 2021-09-28 LAB
ALBUMIN SERPL BCP-MCNC: 4.1 G/DL (ref 3.5–5.2)
ALP SERPL-CCNC: 74 U/L (ref 55–135)
ALT SERPL W/O P-5'-P-CCNC: 19 U/L (ref 10–44)
ANION GAP SERPL CALC-SCNC: 9 MMOL/L (ref 8–16)
AST SERPL-CCNC: 20 U/L (ref 10–40)
BASOPHILS # BLD AUTO: 0.01 K/UL (ref 0–0.2)
BASOPHILS NFR BLD: 0.1 % (ref 0–1.9)
BILIRUB SERPL-MCNC: 0.4 MG/DL (ref 0.1–1)
BNP SERPL-MCNC: 46 PG/ML (ref 0–99)
BUN SERPL-MCNC: 8 MG/DL (ref 6–20)
CALCIUM SERPL-MCNC: 9.6 MG/DL (ref 8.7–10.5)
CHLORIDE SERPL-SCNC: 103 MMOL/L (ref 95–110)
CO2 SERPL-SCNC: 24 MMOL/L (ref 23–29)
CREAT SERPL-MCNC: 0.9 MG/DL (ref 0.5–1.4)
D DIMER PPP IA.FEU-MCNC: 1.8 MG/L FEU
DIFFERENTIAL METHOD: ABNORMAL
EOSINOPHIL # BLD AUTO: 0 K/UL (ref 0–0.5)
EOSINOPHIL NFR BLD: 0 % (ref 0–8)
ERYTHROCYTE [DISTWIDTH] IN BLOOD BY AUTOMATED COUNT: 12.5 % (ref 11.5–14.5)
EST. GFR  (AFRICAN AMERICAN): >60 ML/MIN/1.73 M^2
EST. GFR  (NON AFRICAN AMERICAN): >60 ML/MIN/1.73 M^2
GLUCOSE SERPL-MCNC: 93 MG/DL (ref 70–110)
HCT VFR BLD AUTO: 40.1 % (ref 37–48.5)
HGB BLD-MCNC: 13.3 G/DL (ref 12–16)
IMM GRANULOCYTES # BLD AUTO: 0.02 K/UL (ref 0–0.04)
IMM GRANULOCYTES NFR BLD AUTO: 0.2 % (ref 0–0.5)
LYMPHOCYTES # BLD AUTO: 1.5 K/UL (ref 1–4.8)
LYMPHOCYTES NFR BLD: 17.8 % (ref 18–48)
MCH RBC QN AUTO: 29.6 PG (ref 27–31)
MCHC RBC AUTO-ENTMCNC: 33.2 G/DL (ref 32–36)
MCV RBC AUTO: 89 FL (ref 82–98)
MONOCYTES # BLD AUTO: 0.5 K/UL (ref 0.3–1)
MONOCYTES NFR BLD: 6 % (ref 4–15)
NEUTROPHILS # BLD AUTO: 6.4 K/UL (ref 1.8–7.7)
NEUTROPHILS NFR BLD: 75.9 % (ref 38–73)
NRBC BLD-RTO: 0 /100 WBC
PLATELET # BLD AUTO: 292 K/UL (ref 150–450)
PMV BLD AUTO: 10.3 FL (ref 9.2–12.9)
POTASSIUM SERPL-SCNC: 3.8 MMOL/L (ref 3.5–5.1)
PROT SERPL-MCNC: 8.1 G/DL (ref 6–8.4)
RBC # BLD AUTO: 4.5 M/UL (ref 4–5.4)
SODIUM SERPL-SCNC: 136 MMOL/L (ref 136–145)
TROPONIN I SERPL DL<=0.01 NG/ML-MCNC: 0.01 NG/ML (ref 0–0.03)
TROPONIN I SERPL DL<=0.01 NG/ML-MCNC: <0.006 NG/ML (ref 0–0.03)
WBC # BLD AUTO: 8.47 K/UL (ref 3.9–12.7)

## 2021-09-28 PROCEDURE — 96375 TX/PRO/DX INJ NEW DRUG ADDON: CPT

## 2021-09-28 PROCEDURE — 85379 FIBRIN DEGRADATION QUANT: CPT | Performed by: EMERGENCY MEDICINE

## 2021-09-28 PROCEDURE — 93010 ELECTROCARDIOGRAM REPORT: CPT | Mod: ,,, | Performed by: INTERNAL MEDICINE

## 2021-09-28 PROCEDURE — 25000003 PHARM REV CODE 250: Performed by: NURSE PRACTITIONER

## 2021-09-28 PROCEDURE — 25000003 PHARM REV CODE 250: Performed by: EMERGENCY MEDICINE

## 2021-09-28 PROCEDURE — 25500020 PHARM REV CODE 255: Performed by: NURSE PRACTITIONER

## 2021-09-28 PROCEDURE — 84484 ASSAY OF TROPONIN QUANT: CPT | Performed by: EMERGENCY MEDICINE

## 2021-09-28 PROCEDURE — 84484 ASSAY OF TROPONIN QUANT: CPT | Mod: 91 | Performed by: NURSE PRACTITIONER

## 2021-09-28 PROCEDURE — 93010 EKG 12-LEAD: ICD-10-PCS | Mod: ,,, | Performed by: INTERNAL MEDICINE

## 2021-09-28 PROCEDURE — 93005 ELECTROCARDIOGRAM TRACING: CPT

## 2021-09-28 PROCEDURE — 99285 EMERGENCY DEPT VISIT HI MDM: CPT | Mod: 25

## 2021-09-28 PROCEDURE — 63600175 PHARM REV CODE 636 W HCPCS: Performed by: NURSE PRACTITIONER

## 2021-09-28 PROCEDURE — 83880 ASSAY OF NATRIURETIC PEPTIDE: CPT | Performed by: EMERGENCY MEDICINE

## 2021-09-28 PROCEDURE — 96374 THER/PROPH/DIAG INJ IV PUSH: CPT | Mod: 59

## 2021-09-28 PROCEDURE — 80053 COMPREHEN METABOLIC PANEL: CPT | Performed by: EMERGENCY MEDICINE

## 2021-09-28 PROCEDURE — 85025 COMPLETE CBC W/AUTO DIFF WBC: CPT | Performed by: EMERGENCY MEDICINE

## 2021-09-28 PROCEDURE — 36415 COLL VENOUS BLD VENIPUNCTURE: CPT | Performed by: NURSE PRACTITIONER

## 2021-09-28 RX ORDER — DICYCLOMINE HYDROCHLORIDE 20 MG/1
20 TABLET ORAL 3 TIMES DAILY PRN
Qty: 20 TABLET | Refills: 0 | Status: SHIPPED | OUTPATIENT
Start: 2021-09-28 | End: 2021-10-28

## 2021-09-28 RX ORDER — KETOROLAC TROMETHAMINE 30 MG/ML
10 INJECTION, SOLUTION INTRAMUSCULAR; INTRAVENOUS
Status: COMPLETED | OUTPATIENT
Start: 2021-09-28 | End: 2021-09-28

## 2021-09-28 RX ORDER — ASPIRIN 325 MG
325 TABLET, DELAYED RELEASE (ENTERIC COATED) ORAL
Status: COMPLETED | OUTPATIENT
Start: 2021-09-28 | End: 2021-09-28

## 2021-09-28 RX ORDER — FAMOTIDINE 10 MG/ML
20 INJECTION INTRAVENOUS
Status: COMPLETED | OUTPATIENT
Start: 2021-09-28 | End: 2021-09-28

## 2021-09-28 RX ORDER — OMEPRAZOLE 20 MG/1
20 CAPSULE, DELAYED RELEASE ORAL DAILY
Qty: 30 CAPSULE | Refills: 0 | Status: SHIPPED | OUTPATIENT
Start: 2021-09-28 | End: 2021-09-30 | Stop reason: SDUPTHER

## 2021-09-28 RX ADMIN — FAMOTIDINE 20 MG: 10 INJECTION INTRAVENOUS at 05:09

## 2021-09-28 RX ADMIN — IOHEXOL 100 ML: 350 INJECTION, SOLUTION INTRAVENOUS at 03:09

## 2021-09-28 RX ADMIN — KETOROLAC TROMETHAMINE 10 MG: 30 INJECTION, SOLUTION INTRAMUSCULAR at 04:09

## 2021-09-28 RX ADMIN — ASPIRIN 325 MG: 325 TABLET, COATED ORAL at 02:09

## 2021-09-28 RX ADMIN — ALUMINUM HYDROXIDE, MAGNESIUM HYDROXIDE, DIMETHICONE 50 ML: 200; 200; 20 LIQUID ORAL at 05:09

## 2021-09-29 ENCOUNTER — TELEPHONE (OUTPATIENT)
Dept: ADMINISTRATIVE | Facility: OTHER | Age: 56
End: 2021-09-29

## 2021-09-30 ENCOUNTER — OFFICE VISIT (OUTPATIENT)
Dept: GASTROENTEROLOGY | Facility: CLINIC | Age: 56
End: 2021-09-30
Payer: COMMERCIAL

## 2021-09-30 VITALS
SYSTOLIC BLOOD PRESSURE: 138 MMHG | DIASTOLIC BLOOD PRESSURE: 95 MMHG | BODY MASS INDEX: 34.96 KG/M2 | HEIGHT: 63 IN | HEART RATE: 86 BPM | WEIGHT: 197.31 LBS

## 2021-09-30 DIAGNOSIS — K21.9 GASTROESOPHAGEAL REFLUX DISEASE, UNSPECIFIED WHETHER ESOPHAGITIS PRESENT: ICD-10-CM

## 2021-09-30 DIAGNOSIS — R10.12 LEFT UPPER QUADRANT ABDOMINAL PAIN: Primary | ICD-10-CM

## 2021-09-30 PROCEDURE — 3008F BODY MASS INDEX DOCD: CPT | Mod: CPTII,S$GLB,, | Performed by: STUDENT IN AN ORGANIZED HEALTH CARE EDUCATION/TRAINING PROGRAM

## 2021-09-30 PROCEDURE — 3075F SYST BP GE 130 - 139MM HG: CPT | Mod: CPTII,S$GLB,, | Performed by: STUDENT IN AN ORGANIZED HEALTH CARE EDUCATION/TRAINING PROGRAM

## 2021-09-30 PROCEDURE — 1159F MED LIST DOCD IN RCRD: CPT | Mod: CPTII,S$GLB,, | Performed by: STUDENT IN AN ORGANIZED HEALTH CARE EDUCATION/TRAINING PROGRAM

## 2021-09-30 PROCEDURE — 99204 PR OFFICE/OUTPT VISIT, NEW, LEVL IV, 45-59 MIN: ICD-10-PCS | Mod: S$GLB,,, | Performed by: STUDENT IN AN ORGANIZED HEALTH CARE EDUCATION/TRAINING PROGRAM

## 2021-09-30 PROCEDURE — 3008F PR BODY MASS INDEX (BMI) DOCUMENTED: ICD-10-PCS | Mod: CPTII,S$GLB,, | Performed by: STUDENT IN AN ORGANIZED HEALTH CARE EDUCATION/TRAINING PROGRAM

## 2021-09-30 PROCEDURE — 3080F PR MOST RECENT DIASTOLIC BLOOD PRESSURE >= 90 MM HG: ICD-10-PCS | Mod: CPTII,S$GLB,, | Performed by: STUDENT IN AN ORGANIZED HEALTH CARE EDUCATION/TRAINING PROGRAM

## 2021-09-30 PROCEDURE — 99999 PR PBB SHADOW E&M-EST. PATIENT-LVL III: ICD-10-PCS | Mod: PBBFAC,,, | Performed by: STUDENT IN AN ORGANIZED HEALTH CARE EDUCATION/TRAINING PROGRAM

## 2021-09-30 PROCEDURE — 3075F PR MOST RECENT SYSTOLIC BLOOD PRESS GE 130-139MM HG: ICD-10-PCS | Mod: CPTII,S$GLB,, | Performed by: STUDENT IN AN ORGANIZED HEALTH CARE EDUCATION/TRAINING PROGRAM

## 2021-09-30 PROCEDURE — 99999 PR PBB SHADOW E&M-EST. PATIENT-LVL III: CPT | Mod: PBBFAC,,, | Performed by: STUDENT IN AN ORGANIZED HEALTH CARE EDUCATION/TRAINING PROGRAM

## 2021-09-30 PROCEDURE — 3080F DIAST BP >= 90 MM HG: CPT | Mod: CPTII,S$GLB,, | Performed by: STUDENT IN AN ORGANIZED HEALTH CARE EDUCATION/TRAINING PROGRAM

## 2021-09-30 PROCEDURE — 99204 OFFICE O/P NEW MOD 45 MIN: CPT | Mod: S$GLB,,, | Performed by: STUDENT IN AN ORGANIZED HEALTH CARE EDUCATION/TRAINING PROGRAM

## 2021-09-30 PROCEDURE — 1159F PR MEDICATION LIST DOCUMENTED IN MEDICAL RECORD: ICD-10-PCS | Mod: CPTII,S$GLB,, | Performed by: STUDENT IN AN ORGANIZED HEALTH CARE EDUCATION/TRAINING PROGRAM

## 2021-09-30 RX ORDER — OMEPRAZOLE 20 MG/1
20 CAPSULE, DELAYED RELEASE ORAL DAILY
Qty: 30 CAPSULE | Refills: 5 | Status: SHIPPED | OUTPATIENT
Start: 2021-09-30 | End: 2021-09-30 | Stop reason: SDUPTHER

## 2021-09-30 RX ORDER — OMEPRAZOLE 20 MG/1
20 CAPSULE, DELAYED RELEASE ORAL DAILY
Qty: 30 CAPSULE | Refills: 5 | Status: SHIPPED | OUTPATIENT
Start: 2021-09-30 | End: 2022-09-30